# Patient Record
Sex: FEMALE | Race: WHITE | Employment: FULL TIME | URBAN - NONMETROPOLITAN AREA
[De-identification: names, ages, dates, MRNs, and addresses within clinical notes are randomized per-mention and may not be internally consistent; named-entity substitution may affect disease eponyms.]

---

## 2017-02-20 ENCOUNTER — OFFICE VISIT (OUTPATIENT)
Dept: FAMILY MEDICINE | Facility: OTHER | Age: 47
End: 2017-02-20
Attending: FAMILY MEDICINE
Payer: COMMERCIAL

## 2017-02-20 VITALS
HEIGHT: 65 IN | WEIGHT: 204 LBS | DIASTOLIC BLOOD PRESSURE: 78 MMHG | SYSTOLIC BLOOD PRESSURE: 128 MMHG | BODY MASS INDEX: 33.99 KG/M2 | HEART RATE: 91 BPM | RESPIRATION RATE: 17 BRPM

## 2017-02-20 DIAGNOSIS — E78.5 HYPERLIPIDEMIA WITH TARGET LDL LESS THAN 130: ICD-10-CM

## 2017-02-20 DIAGNOSIS — Z12.31 ENCOUNTER FOR SCREENING MAMMOGRAM FOR BREAST CANCER: ICD-10-CM

## 2017-02-20 DIAGNOSIS — Z83.1: ICD-10-CM

## 2017-02-20 DIAGNOSIS — F90.0 ATTENTION DEFICIT HYPERACTIVITY DISORDER (ADHD), PREDOMINANTLY INATTENTIVE TYPE: ICD-10-CM

## 2017-02-20 DIAGNOSIS — Z00.00 ROUTINE GENERAL MEDICAL EXAMINATION AT A HEALTH CARE FACILITY: Primary | ICD-10-CM

## 2017-02-20 PROCEDURE — 99396 PREV VISIT EST AGE 40-64: CPT | Performed by: FAMILY MEDICINE

## 2017-02-20 RX ORDER — MULTIVITAMIN,THERAPEUTIC
1 TABLET ORAL DAILY
COMMUNITY

## 2017-02-20 ASSESSMENT — PAIN SCALES - GENERAL: PAINLEVEL: NO PAIN (0)

## 2017-02-20 NOTE — PATIENT INSTRUCTIONS
1.  Increase fish oil to 2000 to 3000mg daily  2.  Wellbutrin works on norepinephrine and seratonin vs straterra  3.  sprinolactone for acne and hair growth

## 2017-02-20 NOTE — NURSING NOTE
"Chief Complaint   Patient presents with     Physical       Initial /78  Pulse 91  Resp 17  Ht 5' 4.5\" (1.638 m)  Wt 204 lb (92.5 kg)  BMI 34.48 kg/m2 Estimated body mass index is 34.48 kg/(m^2) as calculated from the following:    Height as of this encounter: 5' 4.5\" (1.638 m).    Weight as of this encounter: 204 lb (92.5 kg).  Medication Reconciliation: complete  "

## 2017-02-20 NOTE — MR AVS SNAPSHOT
After Visit Summary   2/20/2017    MS Ada Ramirez    MRN: [id number]           Patient Information     Date Of Birth          1970        Visit Information        Provider Department      2/20/2017 1:30 PM [facility]        Today's Diagnoses     Routine general medical examination at a health care facility    -  1    Family history of amebiasis        Hyperlipidemia with target LDL less than 130        Attention deficit hyperactivity disorder (ADHD), predominantly inattentive type        Encounter for screening mammogram for breast cancer          Care Instructions    1.  Increase fish oil to 2000 to 3000mg daily  2.  Wellbutrin works on norepinephrine and seratonin vs straterra  3.  sprinolactone for acne and hair growth        Follow-ups after your visit        Future tests that were ordered for you today     Open Future Orders        Priority Expected Expires Ordered    Comprehensive metabolic panel Routine 2/21/2017 2/2/2018 2/20/2017    Lipid Profile (Chol, Trig, HDL, LDL calc) Routine 2/21/2017 2/2/2018 2/20/2017    Fecal Lactoferrin Routine  2/20/2018 2/20/2017    Stool culture SSCE Routine  2/20/2018 2/20/2017    Ova and Parasite Screen Routine  2/20/2018 2/20/2017            Who to contact     If you have questions or need follow up information about today's clinic visit or your schedule please contact [facility] directly at [phone].  Normal or non-critical lab and imaging results will be communicated to you by MyChart, letter or phone within 4 business days after the clinic has received the results. If you do not hear from us within 7 days, please contact the clinic through MyChart or phone. If you have a critical or abnormal lab result, we will notify you by phone as soon as possible.  Submit refill requests through [employer] or call your pharmacy and they will forward the refill request to us. Please allow 3 business days for  "your refill to be completed.          Additional Information About Your Visit        [employer] Information     [employer] gives you secure access to your electronic health record. If you see a primary care provider, you can also send messages to your care team and make appointments. If you have questions, please call your primary care clinic.  If you do not have a primary care provider, please call [phone] and they will assist you.        Care EveryWhere ID     This is your Care EveryWhere ID. This could be used by other organizations to access your [facility] medical records  [id number]        Your Vitals Were     Pulse Respirations Height BMI (Body Mass Index)          91 17 5' 4.5\" (1.638 m) 34.48 kg/m2         Blood Pressure from Last 3 Encounters:   02/20/17 128/78   09/16/16 118/70   08/26/16 122/78    Weight from Last 3 Encounters:   02/20/17 204 lb (92.5 kg)   09/16/16 196 lb (88.9 kg)   08/26/16 208 lb (94.3 kg)              We Performed the Following     MA SCREENING DIGITAL BILATERAL ([place])        Primary Care Provider Office Phone # Fax #    Gloria Arguelles [phone]       [facility] [place] [address]  [place] [address]        Thank you!     Thank you for choosing [facility] [place]  for your care. Our goal is always to provide you with excellent care. Hearing back from our patients is one way we can continue to improve our services. Please take a few minutes to complete the written survey that you may receive in the mail after your visit with us. Thank you!             Your Updated Medication List - Protect others around you: Learn how to safely use, store and throw away your medicines at www.disposemymeds.org.          This list is accurate as of: 2/20/17  2:08 PM.  Always use your most recent med list.                   Brand Name Dispense Instructions for use    Fish Oil Oil      1 capsule daily       GREEN TEA PO      Take 1 tablet by mouth daily       " HERBALS      DEM       multivitamin, therapeutic Tabs tablet      Take 1 tablet by mouth daily

## 2017-02-21 NOTE — PROGRESS NOTES
Female History and Physical     Ada Ramirez MRN# [id number]   YOB: 1970 Age: 46 year old     Primary care provider: Gloria Arguelles                 Chief Complaint:   Wants to talk about alternative ADHD treatments    History is obtained from the patient         History of Present Illness:   This patient is a 46 year old female who presents for CPE and above             Past Medical History:     Past Medical History   Diagnosis Date     Acne      ADHD (attention deficit hyperactivity disorder)      Anxiety      Constipation      Obesity      Rosacea      Tremor of hands and face      essential tremor             Past Surgical History:     Past Surgical History   Procedure Laterality Date     Hysterectomy  2012     ovaries, cervix remain, fibroids, menorrhagia              Gynecologic History:   No LMP recorded. Patient has had a hysterectomy.  Contraception:   Status post hysterectomy  Sexually transmitted disease history:  none  PAP smear history:  Last PAP was normal.       Last mammogram:  Mammogram was normal.          Obstetrical History:   See Epic         Social History:     Social History   Substance Use Topics     Smoking status: Never Smoker     Smokeless tobacco: Never Used     Alcohol use Yes      Comment: 1/week             Family History:     Family History   Problem Relation Age of Onset     Family History Negative Mother      Psychotic Disorder Father      ADHD     Breast Cancer Maternal Grandmother 58     smoker     Neurologic Disorder Sister      MS, not on meds, may not have dx     Family History Negative Brother      Psychotic Disorder Paternal Grandmother      demenita     CEREBROVASCULAR DISEASE Paternal Grandfather      Unknown/Adopted Maternal Grandfather              Immunizations:     Immunization History   Administered Date(s) Administered     Influenza (IIV3) 12/27/2010, 10/13/2011     Influenza Vaccine IM 3yrs+ 4 Valent IIV4 11/24/2014     Tdap (Adacel,Boostrix)  "08/31/2012            Allergies:     Allergies   Allergen Reactions     Sulfa Drugs Rash             Medications:     Current Outpatient Prescriptions:      Green Tea, Camillia sinensis, (GREEN TEA PO), Take 1 tablet by mouth daily, Disp: , Rfl:      Fish Oil OIL, 1 capsule daily, Disp: , Rfl:      HERBALS, DEM, Disp: , Rfl:      multivitamin, therapeutic (THERA-VIT) TABS tablet, Take 1 tablet by mouth daily, Disp: , Rfl:             Review of Systems:   A comprehensive, 10 point review of systems was performed and found to be negative except: as above              Physical Exam:   Vitals were reviewed  Blood pressure 128/78, pulse 91, resp. rate 17, height 5' 4.5\" (1.638 m), weight 204 lb (92.5 kg).  Constitutional:   awake, alert, cooperative, no apparent distress, and appears stated age   Eyes:   Lids and lashes normal, pupils equal, round and reactive to light, extra ocular muscles intact, sclera clear, conjunctiva normal   ENT:   Normocephalic, without obvious abnormality, atramatic, external ears without lesions, oral pharynx with moist mucus membranes, tonsils without erythema or exudates, gums normal and good dentition.   Neck:   Supple, symmetrical, trachea midline, no adenopathy, thyroid symmetric, not enlarged and no tenderness, skin normal   Hematologic / Lymphatic:   no cervical lymphadenopathy   Lungs:   No increased work of breathing, good air exchange, clear to auscultation bilaterally, no crackles or wheezing   Cardiovascular:   Normal apical impulse, regular rate and rhythm, normal S1 and S2, no S3 or S4, and no murmur noted   Abdomen:   No scars, normal bowel sounds, soft, non-distended, non-tender, no masses palpated, no hepatosplenomegally   Chest / Breast:   Breasts symmetrical, skin without lesion(s), no nipple retraction or dimpling, no nipple discharge, no masses palpated, no axillary or supraclavicular adenopathy   Genitounirinary:   External Genitalia:  General appearance; normal  Urethra: "  Fullness absent  Bladder:  Fullness absent, Masses absent  Vagina:  General appearance normal  Cervix:  Palpated wnl  Uterus:  Hystory of hysterectomy  Adenexa:  Masses absent, Tenderness absent   Musculoskeletal:   There is no redness, warmth, or swelling of the joints.  Full range of motion noted.  Motor strength is 5 out of 5 all extremities bilaterally.  Tone is normal.   Neurologic:   Awake, alert, oriented to name, place and time.  Cranial nerves II-XII are grossly intact.  Motor is 5 out of 5 bilaterally.  Sensory is intact.  and gait is normal.   Neuropsychiatric:   General: normal, calm and normal eye contact  Level of consciousness: alert / normal  Affect: anxious  Orientation: oriented to self, place, time and situation  Memory and insight: normal, memory for past and recent events intact and thought process normal   Skin:   no bruising or bleeding, Body Locations:  No rashes          Data:   No visits with results within 1 Day(s) from this visit.  Latest known visit with results is:    Office Visit on 02/18/2015   Component Date Value Ref Range Status     Cholesterol 02/18/2015 209* <200 mg/dL Final    Comment: LDL Cholesterol is the primary guide to therapy.   The NCEP recommends further evaluation of: patients with cholesterol greater   than 200 mg/dL if additional risk factors are present, cholesterol greater   than   240 mg/dL, triglycerides greater than 150 mg/dL, or HDL less than 40 mg/dL.       Triglycerides 02/18/2015 259* 0 - 150 mg/dL Final     HDL Cholesterol 02/18/2015 47* >50 mg/dL Final     LDL Cholesterol Calculated 02/18/2015 110  0 - 129 mg/dL Final    Comment: LDL Cholesterol is the primary guide to therapy: LDL-cholesterol goal in high   risk patients is <100 mg/dL and in very high risk patients is <70 mg/dL.       VLDL-Cholesterol 02/18/2015 52* 0 - 30 mg/dL Final     Cholesterol/HDL Ratio 02/18/2015 4.4  0.0 - 5.0 Final     Sodium 02/18/2015 139  133 - 144 mmol/L Final     Potassium  02/18/2015 3.6  3.4 - 5.3 mmol/L Final     Chloride 02/18/2015 106  94 - 109 mmol/L Final     Carbon Dioxide 02/18/2015 29  20 - 32 mmol/L Final     Anion Gap 02/18/2015 4  3 - 14 mmol/L Final     Glucose 02/18/2015 84  70 - 99 mg/dL Final    Comment: Effective 7/30/2014, the reference range for this assay has changed to reflect   new instrumentation/methodology.       Urea Nitrogen 02/18/2015 13  7 - 30 mg/dL Final    Comment: Effective 7/30/2014, the reference range for this assay has changed to reflect   new instrumentation/methodology.       Creatinine 02/18/2015 0.74  0.52 - 1.04 mg/dL Final     GFR Estimate 02/18/2015 85  >60 mL/min/1.7m2 Final    Non  GFR Calc     GFR Estimate If Black 02/18/2015   >60 mL/min/1.7m2 Final                    Value:>90   GFR Calc       Calcium 02/18/2015 8.6  8.5 - 10.1 mg/dL Final    Comment: Effective 7/30/2014, the reference range for this assay has changed to reflect   new instrumentation/methodology.       Bilirubin Total 02/18/2015 0.3  0.2 - 1.3 mg/dL Final     Albumin 02/18/2015 3.9  3.4 - 5.0 g/dL Final     Protein Total 02/18/2015 8.0  6.8 - 8.8 g/dL Final     Alkaline Phosphatase 02/18/2015 122  40 - 150 U/L Final     ALT 02/18/2015 134* 0 - 50 U/L Final     AST 02/18/2015 58* 0 - 45 U/L Final     TSH 02/18/2015 1.18  0.40 - 4.00 mU/L Final    Comment: Effective 7/30/2014, the reference range for this assay has changed to reflect   new instrumentation/methodology.       Lutropin 02/18/2015 2.2  IU/L Final    Comment: LH Reference Range   Female: Follicular      1.9-12.5           Mid-cycle       8.7-76.3           Luteal          0.5-16.9           Postmenopausal  15.9-54.0       FSH 02/18/2015 3.2  IU/L Final    Comment: FSH Reference Range   Female: Follicular      2.5-10.2           Mid-cycle       3.4-33.4           Luteal          1.5-9.1           Postmenopausal  23.0-116.3       Percent Testosterone Free 02/18/2015 1.7  1.0 - 3.8  % Final     Testosterone Total 02/18/2015 18  8 - 60 ng/dL Final    Comment: This test was developed and its performance characteristics determined by the   [facility],  [facility]. It has   not been cleared or approved by the FDA. The laboratory is regulated under   CLIA   as qualified to perform high-complexity testing. This test is used for   clinical   purposes. It should not be regarded as investigational or for research.       Testosterone Free 02/18/2015 0.3  0.1 - 1.5 ng/dL Final    Comment: Analyte Specific Reagents (ASRs) are used in many laboratory tests necessary   for   standard medical care and generally do not require FDA approval.  This test   was   developed and its preformance characteristics determined by   [facility].  It has not been cleared or approved by   the U.S. Food and Drug Administration.       DHEA Sulfate 02/18/2015 61  35 - 430 ug/dL Final     Androstenedione 02/18/2015 0.515   Final    Comment: Reference range: 0.130 to 0.820  Unit: ng/mL  (Note)  INTERPRETIVE INFORMATION: Androstenedione, Females 18 years  and older  Pre-menopausal: 0.26-2.14 ng/mL  Post-menopausal: 0.13-0.82 ng/mL  REFERENCE INTERVAL: Androstenedione by [facility]  Access complete set of age- and/or gender-specific  reference intervals for this test in the [employer]  Test Directory ([employer]).  Test developed and characteristics determined by [employer]. See Compliance Statement B: [employer]/CS  Performed by [employer],  [address] [place] [id number] [phone]  [email or web address], Juanpablo Wade MD, Lab. Director       Prolactin 02/18/2015 8  3 - 27 ug/L Final    Reference ranges apply to non-pregnant females only.   ]         Assessment and Plan:   (Z00.00) Routine general medical examination at a health care facility  (primary encounter diagnosis)  Comment:   Plan: f/u 1 year    (Z83.1) Family  history of amebiasis  Comment: boyfriend with known diagnosis   Plan: Stool culture SSCE, Ova and Parasite Screen,         Fecal Lactoferrin          (E78.5) Hyperlipidemia with target LDL less than 130  Comment:   Plan: Lipid Profile (Chol, Trig, HDL, LDL calc),         Comprehensive metabolic panel        Will come fasting    (F90.0) Attention deficit hyperactivity disorder (ADHD), predominantly inattentive type  Comment:   Plan: using green tea, fish oil and DIM    (Z12.31) Encounter for screening mammogram for breast cancer  Comment:   Plan: MA SCREENING DIGITAL BILATERAL ([place])             Patient was agreeable to this plan and had no further questions.    Gloria Arguelles MD  2/20/2017  6:32 PM

## 2017-02-27 DIAGNOSIS — Z83.1: ICD-10-CM

## 2017-02-27 LAB — LACTOFERRIN STL QL IA: NORMAL

## 2017-02-27 PROCEDURE — 87046 STOOL CULTR AEROBIC BACT EA: CPT | Mod: 59 | Performed by: FAMILY MEDICINE

## 2017-02-27 PROCEDURE — 83630 LACTOFERRIN FECAL (QUAL): CPT | Performed by: FAMILY MEDICINE

## 2017-02-27 PROCEDURE — 87046 STOOL CULTR AEROBIC BACT EA: CPT | Performed by: FAMILY MEDICINE

## 2017-02-27 PROCEDURE — 87329 GIARDIA AG IA: CPT | Performed by: FAMILY MEDICINE

## 2017-02-27 PROCEDURE — 87015 SPECIMEN INFECT AGNT CONCNTJ: CPT | Performed by: FAMILY MEDICINE

## 2017-02-27 PROCEDURE — 87328 CRYPTOSPORIDIUM AG IA: CPT | Performed by: FAMILY MEDICINE

## 2017-02-27 PROCEDURE — 87899 AGENT NOS ASSAY W/OPTIC: CPT | Performed by: FAMILY MEDICINE

## 2017-02-27 PROCEDURE — 87045 FECES CULTURE AEROBIC BACT: CPT | Performed by: FAMILY MEDICINE

## 2017-02-28 LAB
G LAMBLIA+CRYPTOSP AG STL QL IA: NORMAL
MICRO REPORT STATUS: NORMAL
SPECIMEN SOURCE: NORMAL

## 2017-03-02 LAB
BACTERIA SPEC CULT: NORMAL
E COLI SXT1+2 STL IA: NORMAL
MICRO REPORT STATUS: NORMAL
SPECIMEN SOURCE: NORMAL

## 2017-05-10 DIAGNOSIS — Z12.31 VISIT FOR SCREENING MAMMOGRAM: Primary | ICD-10-CM

## 2017-05-10 PROCEDURE — G0202 SCR MAMMO BI INCL CAD: HCPCS | Mod: TC | Performed by: RADIOLOGY

## 2017-05-10 PROCEDURE — 77063 BREAST TOMOSYNTHESIS BI: CPT | Mod: TC | Performed by: RADIOLOGY

## 2018-02-01 ENCOUNTER — TELEPHONE (OUTPATIENT)
Dept: FAMILY MEDICINE | Facility: OTHER | Age: 48
End: 2018-02-01

## 2018-02-01 NOTE — TELEPHONE ENCOUNTER
Left message explaining first available appt is feb 22nd but she is more than welcome to see if another provider has an opening sooner. Also explained she could go to . Gave number for scheduling.

## 2018-02-01 NOTE — TELEPHONE ENCOUNTER
2:54 PM    Reason for Call: OVERBOOK    Patient is having the following symptoms: patient has heartburn, mostly in the afternoon/evening.  I told her we are trained to tell anyone who calls in with that symptom to report to the ER.  She does not want to.  Instead, she would like nurse to return her call with the next soonest appointment time with Dr. Arguelles    The patient is requesting an appointment for (see above).    Was an appointment offered for this call? No  If yes : Appointment type              Date    Preferred method for responding to this message: Telephone Call     What is your phone number [phone]    If we cannot reach you directly, may we leave a detailed response at the number you provided? Yes    Can this message wait until your PCP/provider returns, if unavailable today? Not applicable, provider is in    Abril Raygoza

## 2018-03-21 ENCOUNTER — OFFICE VISIT (OUTPATIENT)
Dept: FAMILY MEDICINE | Facility: OTHER | Age: 48
End: 2018-03-21
Attending: FAMILY MEDICINE
Payer: COMMERCIAL

## 2018-03-21 VITALS
OXYGEN SATURATION: 99 % | BODY MASS INDEX: 35.66 KG/M2 | TEMPERATURE: 98.4 F | WEIGHT: 211 LBS | HEART RATE: 77 BPM | DIASTOLIC BLOOD PRESSURE: 72 MMHG | SYSTOLIC BLOOD PRESSURE: 126 MMHG

## 2018-03-21 DIAGNOSIS — Z00.00 ROUTINE GENERAL MEDICAL EXAMINATION AT A HEALTH CARE FACILITY: Primary | ICD-10-CM

## 2018-03-21 DIAGNOSIS — R73.09 ELEVATED GLUCOSE: ICD-10-CM

## 2018-03-21 DIAGNOSIS — F90.0 ATTENTION DEFICIT HYPERACTIVITY DISORDER (ADHD), PREDOMINANTLY INATTENTIVE TYPE: ICD-10-CM

## 2018-03-21 DIAGNOSIS — E66.9 OBESITY WITH SERIOUS COMORBIDITY, UNSPECIFIED CLASSIFICATION, UNSPECIFIED OBESITY TYPE: ICD-10-CM

## 2018-03-21 DIAGNOSIS — E78.5 HYPERLIPIDEMIA WITH TARGET LDL LESS THAN 130: ICD-10-CM

## 2018-03-21 DIAGNOSIS — Z12.39 SCREENING BREAST EXAMINATION: ICD-10-CM

## 2018-03-21 DIAGNOSIS — G47.39 OTHER SLEEP APNEA: ICD-10-CM

## 2018-03-21 DIAGNOSIS — K21.9 GASTROESOPHAGEAL REFLUX DISEASE, ESOPHAGITIS PRESENCE NOT SPECIFIED: ICD-10-CM

## 2018-03-21 LAB
ALBUMIN SERPL-MCNC: 3.9 G/DL (ref 3.4–5)
ALP SERPL-CCNC: 97 U/L (ref 40–150)
ALT SERPL W P-5'-P-CCNC: 43 U/L (ref 0–50)
ANION GAP SERPL CALCULATED.3IONS-SCNC: 6 MMOL/L (ref 3–14)
AST SERPL W P-5'-P-CCNC: 24 U/L (ref 0–45)
BILIRUB SERPL-MCNC: 0.4 MG/DL (ref 0.2–1.3)
BUN SERPL-MCNC: 13 MG/DL (ref 7–30)
CALCIUM SERPL-MCNC: 9 MG/DL (ref 8.5–10.1)
CHLORIDE SERPL-SCNC: 102 MMOL/L (ref 94–109)
CHOLEST SERPL-MCNC: 241 MG/DL
CO2 SERPL-SCNC: 28 MMOL/L (ref 20–32)
CREAT SERPL-MCNC: 0.76 MG/DL (ref 0.52–1.04)
EST. AVERAGE GLUCOSE BLD GHB EST-MCNC: 114 MG/DL
GFR SERPL CREATININE-BSD FRML MDRD: 80 ML/MIN/1.7M2
GLUCOSE SERPL-MCNC: 89 MG/DL (ref 70–99)
HBA1C MFR BLD: 5.6 % (ref 4.3–6)
HDLC SERPL-MCNC: 37 MG/DL
LDLC SERPL CALC-MCNC: 166 MG/DL
NONHDLC SERPL-MCNC: 204 MG/DL
POTASSIUM SERPL-SCNC: 3.8 MMOL/L (ref 3.4–5.3)
PROT SERPL-MCNC: 8.2 G/DL (ref 6.8–8.8)
SODIUM SERPL-SCNC: 136 MMOL/L (ref 133–144)
TRIGL SERPL-MCNC: 192 MG/DL

## 2018-03-21 PROCEDURE — 36415 COLL VENOUS BLD VENIPUNCTURE: CPT | Performed by: FAMILY MEDICINE

## 2018-03-21 PROCEDURE — 83036 HEMOGLOBIN GLYCOSYLATED A1C: CPT | Performed by: FAMILY MEDICINE

## 2018-03-21 PROCEDURE — 80053 COMPREHEN METABOLIC PANEL: CPT | Performed by: FAMILY MEDICINE

## 2018-03-21 PROCEDURE — G0123 SCREEN CERV/VAG THIN LAYER: HCPCS | Performed by: FAMILY MEDICINE

## 2018-03-21 PROCEDURE — 99000 SPECIMEN HANDLING OFFICE-LAB: CPT | Performed by: FAMILY MEDICINE

## 2018-03-21 PROCEDURE — 99396 PREV VISIT EST AGE 40-64: CPT | Performed by: FAMILY MEDICINE

## 2018-03-21 PROCEDURE — 87624 HPV HI-RISK TYP POOLED RSLT: CPT | Mod: 90 | Performed by: FAMILY MEDICINE

## 2018-03-21 PROCEDURE — 80061 LIPID PANEL: CPT | Performed by: FAMILY MEDICINE

## 2018-03-21 ASSESSMENT — ANXIETY QUESTIONNAIRES
6. BECOMING EASILY ANNOYED OR IRRITABLE: NOT AT ALL
3. WORRYING TOO MUCH ABOUT DIFFERENT THINGS: NOT AT ALL
4. TROUBLE RELAXING: NOT AT ALL
2. NOT BEING ABLE TO STOP OR CONTROL WORRYING: NOT AT ALL
7. FEELING AFRAID AS IF SOMETHING AWFUL MIGHT HAPPEN: NOT AT ALL
5. BEING SO RESTLESS THAT IT IS HARD TO SIT STILL: NOT AT ALL
GAD7 TOTAL SCORE: 0
1. FEELING NERVOUS, ANXIOUS, OR ON EDGE: NOT AT ALL

## 2018-03-21 ASSESSMENT — PAIN SCALES - GENERAL: PAINLEVEL: NO PAIN (0)

## 2018-03-21 NOTE — LETTER
March 26, 2018      Ada Ramirez  [address]  [facility] [address]        Dear ,    We are writing to inform you of your test results.        Labs show no diabetes yet.  Your cholesterol is higher. Your should try to  work on 5-10 lb weight loss, cut back on sugars, and increase your amount of exercise.       Resulted Orders   Lipid Profile (Chol, Trig, HDL, LDL calc)   Result Value Ref Range    Cholesterol 241 (H) <200 mg/dL      Comment:      Desirable:       <200 mg/dl    Triglycerides 192 (H) <150 mg/dL      Comment:      Borderline high:  150-199 mg/dl  High:             200-499 mg/dl  Very high:       >499 mg/dl      HDL Cholesterol 37 (L) >49 mg/dL    LDL Cholesterol Calculated 166 (H) <100 mg/dL      Comment:      Above desirable:  100-129 mg/dl  Borderline High:  130-159 mg/dL  High:             160-189 mg/dL  Very high:       >189 mg/dl      Non HDL Cholesterol 204 (H) <130 mg/dL      Comment:      Above Desirable:  130-159 mg/dl  Borderline high:  160-189 mg/dl  High:             190-219 mg/dl  Very high:       >219 mg/dl     Comprehensive metabolic panel   Result Value Ref Range    Sodium 136 133 - 144 mmol/L    Potassium 3.8 3.4 - 5.3 mmol/L    Chloride 102 94 - 109 mmol/L    Carbon Dioxide 28 20 - 32 mmol/L    Anion Gap 6 3 - 14 mmol/L    Glucose 89 70 - 99 mg/dL    Urea Nitrogen 13 7 - 30 mg/dL    Creatinine 0.76 0.52 - 1.04 mg/dL    GFR Estimate 80 >60 mL/min/1.7m2      Comment:      Non  GFR Calc    GFR Estimate If Black >90 >60 mL/min/1.7m2      Comment:       GFR Calc    Calcium 9.0 8.5 - 10.1 mg/dL    Bilirubin Total 0.4 0.2 - 1.3 mg/dL    Albumin 3.9 3.4 - 5.0 g/dL    Protein Total 8.2 6.8 - 8.8 g/dL    Alkaline Phosphatase 97 40 - 150 U/L    ALT 43 0 - 50 U/L    AST 24 0 - 45 U/L   Hemoglobin A1c   Result Value Ref Range    Hemoglobin A1C 5.6 4.3 - 6.0 %   Estimated Average Glucose   Result Value Ref Range    Estimated Average Glucose 114 mg/dL        If you have any questions or concerns, please call the clinic at the number listed above.       Sincerely,        Gloria Arguelles MD
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
## 2018-03-21 NOTE — LETTER
March 28, 2018      Ada Ramirez  [address]  [place] [address]        Dear ,    We are writing to inform you of your test results.    Your pap results were within normal limits.  Any questions or concerns please feel free to contact the office.  Thank you.    Resulted Orders   Lipid Profile (Chol, Trig, HDL, LDL calc)   Result Value Ref Range    Cholesterol 241 (H) <200 mg/dL      Comment:      Desirable:       <200 mg/dl    Triglycerides 192 (H) <150 mg/dL      Comment:      Borderline high:  150-199 mg/dl  High:             200-499 mg/dl  Very high:       >499 mg/dl      HDL Cholesterol 37 (L) >49 mg/dL    LDL Cholesterol Calculated 166 (H) <100 mg/dL      Comment:      Above desirable:  100-129 mg/dl  Borderline High:  130-159 mg/dL  High:             160-189 mg/dL  Very high:       >189 mg/dl      Non HDL Cholesterol 204 (H) <130 mg/dL      Comment:      Above Desirable:  130-159 mg/dl  Borderline high:  160-189 mg/dl  High:             190-219 mg/dl  Very high:       >219 mg/dl     Comprehensive metabolic panel   Result Value Ref Range    Sodium 136 133 - 144 mmol/L    Potassium 3.8 3.4 - 5.3 mmol/L    Chloride 102 94 - 109 mmol/L    Carbon Dioxide 28 20 - 32 mmol/L    Anion Gap 6 3 - 14 mmol/L    Glucose 89 70 - 99 mg/dL    Urea Nitrogen 13 7 - 30 mg/dL    Creatinine 0.76 0.52 - 1.04 mg/dL    GFR Estimate 80 >60 mL/min/1.7m2      Comment:      Non  GFR Calc    GFR Estimate If Black >90 >60 mL/min/1.7m2      Comment:       GFR Calc    Calcium 9.0 8.5 - 10.1 mg/dL    Bilirubin Total 0.4 0.2 - 1.3 mg/dL    Albumin 3.9 3.4 - 5.0 g/dL    Protein Total 8.2 6.8 - 8.8 g/dL    Alkaline Phosphatase 97 40 - 150 U/L    ALT 43 0 - 50 U/L    AST 24 0 - 45 U/L   Hemoglobin A1c   Result Value Ref Range    Hemoglobin A1C 5.6 4.3 - 6.0 %   A pap thin layer screen with  HPV - recommended age 30 - 65 years (select HPV order below)   Result Value Ref Range    PAP NIL     Copath  Report         Patient Name: ELIZABETH MARIE  MR#: [id number]  Specimen #: [id number]  Collected: 3/21/2018  Received: 3/22/2018  Reported: 3/27/2018 09:21  Ordering Phy(s): GLORIA MCCRARY    For improved result formatting, select 'View Enhanced Report Format' under   Linked Documents section.    SPECIMEN/STAIN PROCESS:  Pap thin layer prep screening ([facility])       Pap-Cyto x 1, HPV ordered x 1    SOURCE: Vaginal  ----------------------------------------------------------------   Pap thin layer prep screening ([facility])  SPECIMEN ADEQUACY:  Satisfactory for evaluation.    CYTOLOGIC INTERPRETATION:    Negative for intraepithelial lesion or malignancy    Electronically signed out by:  BUCKY Brasher ( ASCP)    Processed and screened at [facility]    CLINICAL HISTORY:    Partial Hysterectomy, Previous normal pap: 1/15,    Papanicolaou Test Limitations:  Cervical cytology is a screening test with   limited sensitivity; regular  screening  is critical for cancer prevention; Pap tests are primarily   effective for the diagnosis/prevention of  squamous cell carcinoma, not adenocarcinomas or other cancers.    TESTING LAB LOCATION:  [address] [address]  [phone]    COLLECTION SITE:  Client:  [facility]  Location: [facility])     Estimated Average Glucose   Result Value Ref Range    Estimated Average Glucose 114 mg/dL       If you have any questions or concerns, please call the clinic at the number listed above.       Sincerely,        Gloria Mccrary MD

## 2018-03-21 NOTE — NURSING NOTE
"Chief Complaint   Patient presents with     Physical       Initial /72  Pulse 77  Temp 98.4  F (36.9  C) (Tympanic)  Wt 211 lb (95.7 kg)  SpO2 99%  BMI 35.66 kg/m2 Estimated body mass index is 35.66 kg/(m^2) as calculated from the following:    Height as of 2/20/17: 5' 4.5\" (1.638 m).    Weight as of this encounter: 211 lb (95.7 kg).  Medication Reconciliation: complete     Evie Santiago    "

## 2018-03-21 NOTE — MR AVS SNAPSHOT
After Visit Summary   3/21/2018    MS Ada Ramirez    MRN: [id number]           Patient Information     Date Of Birth          1970        Visit Information        Provider Department      3/21/2018 1:30 PM Gloria Arguelles MD [facility]        Today's Diagnoses     Routine general medical examination at a health care facility    -  1    Hyperlipidemia with target LDL less than 130        Attention deficit hyperactivity disorder (ADHD), predominantly inattentive type        Elevated glucose        Gastroesophageal reflux disease, esophagitis presence not specified        Other sleep apnea        Obesity with serious comorbidity, unspecified classification, unspecified obesity type        Screening breast examination          Care Instructions      Preventive Health Recommendations  Female Ages 40 to 49    Yearly exam:     See your health care provider every year in order to  1. Review health changes.   2. Discuss preventive care.    3. Review your medicines if your doctor prescribed any.      Get a Pap test every three years (unless you have an abnormal result and your provider advises testing more often).      If you get Pap tests with HPV test, you only need to test every 5 years, unless you have an abnormal result. You do not need a Pap test if your uterus was removed (hysterectomy) and you have not had cancer.      You should be tested each year for STDs (sexually transmitted diseases), if you're at risk.       Ask your doctor if you should have a mammogram.      Have a colonoscopy (test for colon cancer) if someone in your family has had colon cancer or polyps before age 50.       Have a cholesterol test every 5 years.       Have a diabetes test (fasting glucose) after age 45. If you are at risk for diabetes, you should have this test every 3 years.    Shots: Get a flu shot each year. Get a tetanus shot every 10 years.     Nutrition:     Eat at least 5 servings of fruits  and vegetables each day.    Eat whole-grain bread, whole-wheat pasta and brown rice instead of white grains and rice.    Talk to your provider about Calcium and Vitamin D.     Lifestyle    Exercise at least 150 minutes a week (an average of 30 minutes a day, 5 days a week). This will help you control your weight and prevent disease.    Limit alcohol to one drink per day.    No smoking.     Wear sunscreen to prevent skin cancer.    See your dentist every six months for an exam and cleaning.          Follow-ups after your visit        Additional Services     SLEEP EVALUATION & MANAGEMENT REFERRAL - ADULT -[facility] [place] [phone] (Age 5 and up)       Please be aware that coverage of these services is subject to the terms and limitations of your health insurance plan.  Call member services at your health plan with any benefit or coverage questions.      Please bring the following to your appointment:    >>   List of current medications   >>   This referral request   >>   Any documents/labs given to you for this referral                      Your next 10 appointments already scheduled     May 16, 2018  8:00 AM CDT   (Arrive by 7:45 AM)   MA SCREENING DIGITAL BILATERAL with HCMA1   [facility] ([facility] [place] )    [address] [facility] [address]   [phone]           Do not use any powder, lotion or deodorant under your arms or on your breast. If you do, we will ask you to remove it before your exam.  Wear comfortable, two-piece clothing.  If you have any allergies, tell your care team.  Bring any previous mammograms from other facilities or have them mailed to the breast center. Three-dimensional (3D) mammograms are available at [place] locations in [facility], [facility], [place], [place], and [place]. [facility] locations include [facility] in [place]. Benefits of 3D mammograms  "include: - Improved rate of cancer detection - Decreases your chance of having to go back for more tests, which means fewer: - \"False-positive\" results (This means that there is an abnormal area but it isn't cancer.) - Invasive testing procedures, such as a biopsy or surgery - Can provide clearer images of the breast if you have dense breast tissue. 3D mammography is an optional exam that anyone can have with a 2D mammogram. It doesn't replace or take the place of a 2D mammogram. 2D mammograms remain an effective screening test for all women.  Not all insurance companies cover the cost of a 3D mammogram. Check with your insurance.              Future tests that were ordered for you today     Open Future Orders        Priority Expected Expires Ordered    MA SCREENING DIGITAL BILATERAL ([place]) Routine 5/18/2018 3/21/2019 3/21/2018    SLEEP EVALUATION & MANAGEMENT REFERRAL - ADULT -[facility] [place] [phone] (Age 5 and up) Routine  3/21/2019 3/21/2018            Who to contact     If you have questions or need follow up information about today's clinic visit or your schedule please contact [facility] directly at [phone].  Normal or non-critical lab and imaging results will be communicated to you by MyChart, letter or phone within 4 business days after the clinic has received the results. If you do not hear from us within 7 days, please contact the clinic through [employer] or phone. If you have a critical or abnormal lab result, we will notify you by phone as soon as possible.  Submit refill requests through [email or web address] or call your pharmacy and they will forward the refill request to us. Please allow 3 business days for your refill to be completed.          Additional Information About Your Visit        [employer] Information     [email or web address] gives you secure access to your electronic health record. If you see a primary care provider, you can also send messages to your care team and make " appointments. If you have questions, please call your primary care clinic.  If you do not have a primary care provider, please call [phone] and they will assist you.        Care EveryWhere ID     This is your Care EveryWhere ID. This could be used by other organizations to access your [place] medical records  [id number]        Your Vitals Were     Pulse Temperature Pulse Oximetry BMI (Body Mass Index)          77 98.4  F (36.9  C) (Tympanic) 99% 35.66 kg/m2         Blood Pressure from Last 3 Encounters:   03/21/18 126/72   02/20/17 128/78   09/16/16 118/70    Weight from Last 3 Encounters:   03/21/18 211 lb (95.7 kg)   02/20/17 204 lb (92.5 kg)   09/16/16 196 lb (88.9 kg)              We Performed the Following     A pap thin layer screen with  HPV - recommended age 30 - 65 years (select HPV order below)     Comprehensive metabolic panel     Hemoglobin A1c     HPV High Risk Types DNA Cervical     Lipid Profile (Chol, Trig, HDL, LDL calc)          Today's Medication Changes          These changes are accurate as of 3/21/18  2:17 PM.  If you have any questions, ask your nurse or doctor.               Start taking these medicines.        Dose/Directions    ranitidine 300 MG tablet   Commonly known as:  ZANTAC   Used for:  Gastroesophageal reflux disease, esophagitis presence not specified   Started by:  Gloria Arguelles MD        Dose:  300 mg   Take 1 tablet (300 mg) by mouth At Bedtime   Quantity:  90 tablet   Refills:  1            Where to get your medicines      [facility] [address]  [place] - [address] AT [facility] [address], [place] [address]     Phone:  [phone]     ranitidine 300 MG tablet                Primary Care Provider Office Phone # Fax #    Gloria Arguelles [phone]       [facility] [place] [address] [facility] [place] [address]        Equal Access to Services     JOSE J MAHMOOD AH: Caro chavez  katarzyna Prieto, wamiguel ángelda ludanielaadaha, qaarpanta kaludwig mccord, maureen ronakin hayaan coryfrancia renocarol laerinnaima chato. So [facility] [phone].    ATENCIÓN: Si habla español, tiene a chowdhury disposición servicios gratuitos de asistencia lingüística. Apolinar al [phone].    We comply with applicable federal civil rights laws and [place] laws. We do not discriminate on the basis of race, color, national origin, age, disability, sex, sexual orientation, or gender identity.            Thank you!     Thank you for choosing [facility]  for your care. Our goal is always to provide you with excellent care. Hearing back from our patients is one way we can continue to improve our services. Please take a few minutes to complete the written survey that you may receive in the mail after your visit with us. Thank you!             Your Updated Medication List - Protect others around you: Learn how to safely use, store and throw away your medicines at www.disposemymeds.org.          This list is accurate as of 3/21/18  2:17 PM.  Always use your most recent med list.                   Brand Name Dispense Instructions for use Diagnosis    Fish Oil Oil      1 capsule daily        GREEN TEA PO      Take 1 tablet by mouth daily        HERBALS      DEM        multivitamin, therapeutic Tabs tablet      Take 1 tablet by mouth daily        ranitidine 300 MG tablet    ZANTAC    90 tablet    Take 1 tablet (300 mg) by mouth At Bedtime    Gastroesophageal reflux disease, esophagitis presence not specified

## 2018-03-22 ASSESSMENT — ANXIETY QUESTIONNAIRES: GAD7 TOTAL SCORE: 0

## 2018-03-22 ASSESSMENT — PATIENT HEALTH QUESTIONNAIRE - PHQ9: SUM OF ALL RESPONSES TO PHQ QUESTIONS 1-9: 2

## 2018-03-27 LAB
COPATH REPORT: NORMAL
PAP: NORMAL

## 2018-03-28 ENCOUNTER — OFFICE VISIT (OUTPATIENT)
Dept: SLEEP MEDICINE | Facility: HOSPITAL | Age: 48
End: 2018-03-28
Attending: FAMILY MEDICINE
Payer: COMMERCIAL

## 2018-03-28 VITALS
BODY MASS INDEX: 35.16 KG/M2 | SYSTOLIC BLOOD PRESSURE: 118 MMHG | RESPIRATION RATE: 12 BRPM | OXYGEN SATURATION: 98 % | WEIGHT: 211 LBS | HEIGHT: 65 IN | DIASTOLIC BLOOD PRESSURE: 86 MMHG | HEART RATE: 82 BPM

## 2018-03-28 DIAGNOSIS — E66.9 OBESITY WITH SERIOUS COMORBIDITY, UNSPECIFIED CLASSIFICATION, UNSPECIFIED OBESITY TYPE: ICD-10-CM

## 2018-03-28 DIAGNOSIS — G47.39 OTHER SLEEP APNEA: ICD-10-CM

## 2018-03-28 DIAGNOSIS — F51.01 PRIMARY INSOMNIA: Primary | ICD-10-CM

## 2018-03-28 LAB
FINAL DIAGNOSIS: ABNORMAL
HPV HR 12 DNA CVX QL NAA+PROBE: POSITIVE
HPV16 DNA SPEC QL NAA+PROBE: NEGATIVE
HPV18 DNA SPEC QL NAA+PROBE: NEGATIVE
SPECIMEN DESCRIPTION: ABNORMAL
SPECIMEN SOURCE CVX/VAG CYTO: ABNORMAL

## 2018-03-28 PROCEDURE — 99212 OFFICE O/P EST SF 10 MIN: CPT | Performed by: INTERNAL MEDICINE

## 2018-03-28 PROCEDURE — G0463 HOSPITAL OUTPT CLINIC VISIT: HCPCS

## 2018-03-28 RX ORDER — ZOLPIDEM TARTRATE 5 MG/1
5 TABLET ORAL
Qty: 2 TABLET | Refills: 5 | Status: SHIPPED | OUTPATIENT
Start: 2018-03-28

## 2018-03-28 NOTE — MR AVS SNAPSHOT
After Visit Summary   3/28/2018    MS Ada Ramirez    MRN: [id number]           Patient Information     Date Of Birth          1970        Visit Information        Provider Department      3/28/2018 3:00 PM Brian Blevins MD [facility]        Today's Diagnoses     Primary insomnia    -  1    Other sleep apnea        Obesity with serious comorbidity, unspecified classification, unspecified obesity type          Care Instructions    In order to help your stay at the Sleep Center to be as comfortable as possible and to obtain the best sleep study possible, the Sleep Center Staff has established the following guidelines:    1.  Please attempt to be here 15 minutes prior to your scheduled appointment time.  If you anticipate being late or you cannot make your overnight appointment, please call us at [phone] or call [phone] and ask for the Sleep Center.  PLEASE MAKE EVERY ATTEMPT TO MAKE YOUR APPOINTMENT.  A SLEEP TECHNICIAN AND A SLEEP ROOM HAS BEEN RESERVED JUST FOR YOU.    2. When you arrive at [facility], please park in the upper lot, by [address].  Enter the hospital at the Emergency Room Entrance.  Follow the signs to the Emergency Room Admitting Desk and tell them you are here for a sleep study in the Sleep Disorder Center.    3. Do not stop any medications, unless specifically requested.  Be sure to bring all medications that you need with you.    4. Do not use any hair creams or gels, moisturizers, rinses or sprays the day of your study.  FOR MALES:  if you are usually clean shaven, please shave before you come in; FOR FEMALES:  do not wear make-up or be prepared to remove it.  This will improve the quality of the study.    5. Please DO NOT USE CAFFEINE OR ALCOHOL after 2:00 PM the day of your test unless advised otherwise.    6. Do not take any naps the day of your test.    7. Attachment of monitoring equipment will take approximately one hour, including an  "explanation of the test.    8. Bring comfortable night clothes to sleep in, two-piece, cotton pajamas or shorts are best.    9. If you have a pillow that you prefer using, please bring it with you; but do not forget to take it home with you in the morning.    10. Most of our studies are complete by approximately 7:00 AM.  In most instances we may wake you during your normal wake time or the time you request to be awakened.    If you have any special needs or questions related to this information or your test, please feel free to call the Sleep Disorder Center at [phone] or [phone] and ask for the Sleep Disorder Center.  Please make every effort to keep your appointment.  A sleep technician and a sleep room have been reserved just for you.  In the event that you are unable to make your appointment, please call as soon as possible to notify us of your cancellation.            Follow-ups after your visit        Your next 10 appointments already scheduled     May 16, 2018  8:00 AM CDT   (Arrive by 7:45 AM)   MA SCREENING DIGITAL BILATERAL with HCMA1   [facility] ([facility] [place] )    [address] [address]   [phone]           Do not use any powder, lotion or deodorant under your arms or on your breast. If you do, we will ask you to remove it before your exam.  Wear comfortable, two-piece clothing.  If you have any allergies, tell your care team.  Bring any previous mammograms from other facilities or have them mailed to the breast center. Three-dimensional (3D) mammograms are available at [place] locations in [facility], [facility], and [place]. [facility] locations include [place] and [facility] in [place]. Benefits of 3D mammograms include: - Improved rate of cancer detection - Decreases your chance of having to go back for more tests, which means fewer: - \"False-positive\" results (This " means that there is an abnormal area but it isn't cancer.) - Invasive testing procedures, such as a biopsy or surgery - Can provide clearer images of the breast if you have dense breast tissue. 3D mammography is an optional exam that anyone can have with a 2D mammogram. It doesn't replace or take the place of a 2D mammogram. 2D mammograms remain an effective screening test for all women.  Not all insurance companies cover the cost of a 3D mammogram. Check with your insurance.            Mar 22, 2019  8:45 AM CDT   (Arrive by 8:30 AM)   PHYSICAL with Gloria Arguelles MD   [facility] [place] ([facility] [place] )    [address]  [place] [address]   [phone]              Future tests that were ordered for you today     Open Future Orders        Priority Expected Expires Ordered    Comprehensive Sleep Study Routine  9/24/2018 3/28/2018            Who to contact     If you have questions or need follow up information about today's clinic visit or your schedule please contact [facility] directly at [phone].  Normal or non-critical lab and imaging results will be communicated to you by eucl3Dhart, letter or phone within 4 business days after the clinic has received the results. If you do not hear from us within 7 days, please contact the clinic through "[employer]t or phone. If you have a critical or abnormal lab result, we will notify you by phone as soon as possible.  Submit refill requests through [employer] or call your pharmacy and they will forward the refill request to us. Please allow 3 business days for your refill to be completed.          Additional Information About Your Visit        [employer] Information     [employer] gives you secure access to your electronic health record. If you see a primary care provider, you can also send messages to your care team and make appointments. If you have questions, please call your primary care clinic.  If you do not have a primary care provider,  "please call [phone] and they will assist you.        Care EveryWhere ID     This is your Care EveryWhere ID. This could be used by other organizations to access your [place] medical records  [id number]        Your Vitals Were     Pulse Respirations Height Pulse Oximetry BMI (Body Mass Index)       82 12 5' 5\" (1.651 m) 98% 35.11 kg/m2        Blood Pressure from Last 3 Encounters:   03/28/18 118/86   03/21/18 126/72   02/20/17 128/78    Weight from Last 3 Encounters:   03/28/18 211 lb (95.7 kg)   03/21/18 211 lb (95.7 kg)   02/20/17 204 lb (92.5 kg)              We Performed the Following     SLEEP EVALUATION & MANAGEMENT REFERRAL - ADULT -[facility] [place] [phone] (Age 5 and up)          Today's Medication Changes          These changes are accurate as of 3/28/18  3:11 PM.  If you have any questions, ask your nurse or doctor.               Start taking these medicines.        Dose/Directions    zolpidem 5 MG tablet   Commonly known as:  AMBIEN   Used for:  Primary insomnia        Dose:  5 mg   Take 1 tablet (5 mg) by mouth nightly as needed for sleep   Quantity:  2 tablet   Refills:  5            Where to get your medicines      Some of these will need a paper prescription and others can be bought over the counter.  Ask your nurse if you have questions.     Bring a paper prescription for each of these medications     zolpidem 5 MG tablet                Primary Care Provider Office Phone # Fax #    Gloria Arguelles [phone]       [facility] [place] [address]  [place] [address]        Equal Access to Services     [facility] AH: Hadii giselle chavez hadasho Soomaali, waaxda luqadaha, qaybta kaalmada maureen mccord. So [facility] [phone].    ATENCIÓN: Si habla español, tiene a chowdhury disposición servicios gratuitos de asistencia lingüística. Llame al [phone].    We comply with applicable federal civil rights laws and [place] " laws. We do not discriminate on the basis of race, color, national origin, age, disability, sex, sexual orientation, or gender identity.            Thank you!     Thank you for choosing [facility]  for your care. Our goal is always to provide you with excellent care. Hearing back from our patients is one way we can continue to improve our services. Please take a few minutes to complete the written survey that you may receive in the mail after your visit with us. Thank you!             Your Updated Medication List - Protect others around you: Learn how to safely use, store and throw away your medicines at www.disposemymeds.org.          This list is accurate as of 3/28/18  3:11 PM.  Always use your most recent med list.                   Brand Name Dispense Instructions for use Diagnosis    Fish Oil Oil      1 capsule daily        GREEN TEA PO      Take 1 tablet by mouth daily        HERBALS      DEM        multivitamin, therapeutic Tabs tablet      Take 1 tablet by mouth daily        ranitidine 300 MG tablet    ZANTAC    90 tablet    Take 1 tablet (300 mg) by mouth At Bedtime    Gastroesophageal reflux disease, esophagitis presence not specified       zolpidem 5 MG tablet    AMBIEN    2 tablet    Take 1 tablet (5 mg) by mouth nightly as needed for sleep    Primary insomnia

## 2018-03-28 NOTE — PROGRESS NOTES
"48 y/o referred by Dr Arguelles with BASIL. Pt has been told she snores loudly and has apneas/gasps. She has a study in [place] 10 y ago that showed just mild BASIL, her weight is up 25 lbs since then and she is much sleepier, falls asleep easily in quiet situations. Sleep hours are 8 to 6, mild insomnia. No AM headaches or restless legs, however she thrashes around a lot while sleeping. At 5 5 206.    PMH ADHD, anxiety, GERD    PE  /86  Pulse 82  Resp 12  Ht 5' 5\" (1.651 m)  Wt 211 lb (95.7 kg)  SpO2 98%  BMI 35.11 kg/m2            [place]  Narrow pharynx, retrognathic chin, full anterior neck      Current Outpatient Prescriptions:      zolpidem (AMBIEN) 5 MG tablet, Take 1 tablet (5 mg) by mouth nightly as needed for sleep, Disp: 2 tablet, Rfl: 5     ranitidine (ZANTAC) 300 MG tablet, Take 1 tablet (300 mg) by mouth At Bedtime, Disp: 90 tablet, Rfl: 1     Green Tea, Camillia sinensis, (GREEN TEA PO), Take 1 tablet by mouth daily, Disp: , Rfl:      Fish Oil OIL, 1 capsule daily, Disp: , Rfl:      HERBALS, DEM, Disp: , Rfl:      multivitamin, therapeutic (THERA-VIT) TABS tablet, Take 1 tablet by mouth daily, Disp: , Rfl:      A/ BASIL  Study ordered, ? PLMs.  "

## 2018-03-28 NOTE — PATIENT INSTRUCTIONS
In order to help your stay at the Sleep Center to be as comfortable as possible and to obtain the best sleep study possible, the Sleep Center Staff has established the following guidelines:    1.  Please attempt to be here 15 minutes prior to your scheduled appointment time.  If you anticipate being late or you cannot make your overnight appointment, please call us at [phone] or call [phone] and ask for the Sleep Center.  PLEASE MAKE EVERY ATTEMPT TO MAKE YOUR APPOINTMENT.  A SLEEP TECHNICIAN AND A SLEEP ROOM HAS BEEN RESERVED JUST FOR YOU.    2. When you arrive at [facility], please park in the upper lot, by [address].  Enter the hospital at the Emergency Room Entrance.  Follow the signs to the Emergency Room Admitting Desk and tell them you are here for a sleep study in the Sleep Disorder Center.    3. Do not stop any medications, unless specifically requested.  Be sure to bring all medications that you need with you.    4. Do not use any hair creams or gels, moisturizers, rinses or sprays the day of your study.  FOR MALES:  if you are usually clean shaven, please shave before you come in; FOR FEMALES:  do not wear make-up or be prepared to remove it.  This will improve the quality of the study.    5. Please DO NOT USE CAFFEINE OR ALCOHOL after 2:00 PM the day of your test unless advised otherwise.    6. Do not take any naps the day of your test.    7. Attachment of monitoring equipment will take approximately one hour, including an explanation of the test.    8. Bring comfortable night clothes to sleep in, two-piece, cotton pajamas or shorts are best.    9. If you have a pillow that you prefer using, please bring it with you; but do not forget to take it home with you in the morning.    10. Most of our studies are complete by approximately 7:00 AM.  In most instances we may wake you during your normal wake time or the time you request to be awakened.    If you have any special needs or  questions related to this information or your test, please feel free to call the Sleep Disorder Center at [phone] or [phone] and ask for the Sleep Disorder Center.  Please make every effort to keep your appointment.  A sleep technician and a sleep room have been reserved just for you.  In the event that you are unable to make your appointment, please call as soon as possible to notify us of your cancellation.

## 2018-03-28 NOTE — NURSING NOTE
Patient ID checked with name and date of birth. Reviewed allergies and home medications. Took Vitals and brief medical history.   An overnight sleep test was ordered and the instructions reviewed she had god understanding.

## 2018-04-03 ENCOUNTER — THERAPY VISIT (OUTPATIENT)
Dept: SLEEP MEDICINE | Facility: HOSPITAL | Age: 48
End: 2018-04-03
Attending: INTERNAL MEDICINE
Payer: COMMERCIAL

## 2018-04-03 DIAGNOSIS — Z12.39 SCREENING BREAST EXAMINATION: ICD-10-CM

## 2018-04-03 DIAGNOSIS — G47.39 OTHER SLEEP APNEA: ICD-10-CM

## 2018-04-03 PROCEDURE — 95810 POLYSOM 6/> YRS 4/> PARAM: CPT

## 2018-04-03 PROCEDURE — 95810 POLYSOM 6/> YRS 4/> PARAM: CPT | Mod: 26 | Performed by: INTERNAL MEDICINE

## 2018-04-03 NOTE — LETTER
Ada NICHOLE [facility]  [address]  [place] [address]    April 5, 2018       Dear Ada      I recently read your sleep study, you do have sleep apnea stopping or slowing your breathing  about 25 times per hour.     This is likely disturbing your sleep and making you feel tired during the day. I think we should try you on an automatically adjusting CPAP mask , hopefully it will make you feel more rested during the day and may help protect you from future health problems.     I will ask our sleep lab staff to set up a trial of the mask, if you have any problems or concerns please give us a call.     I'll plan to see you in follow up in the future and I hope it helps.                                                                                       Sincerely,        Brian Blevins MD, D,[facility]           [address] [place] [id number] [phone]

## 2018-04-03 NOTE — MR AVS SNAPSHOT
"              After Visit Summary   4/3/2018    MS Ada Ramirez    MRN: [id number]           Patient Information     Date Of Birth          1970        Visit Information        Provider Department      4/3/2018 7:30 PM [facility]        Today's Diagnoses     Other sleep apnea        Screening breast examination           Follow-ups after your visit        Your next 10 appointments already scheduled     May 16, 2018  8:00 AM CDT   (Arrive by 7:45 AM)   MA SCREENING DIGITAL BILATERAL with HCMA1   [facility] [place] [facility] ([facility] [place] )    [address]  [place] [address]   [phone]           Do not use any powder, lotion or deodorant under your arms or on your breast. If you do, we will ask you to remove it before your exam.  Wear comfortable, two-piece clothing.  If you have any allergies, tell your care team.  Bring any previous mammograms from other facilities or have them mailed to the breast center. Three-dimensional (3D) mammograms are available at [place] locations in [facility], [facility], [facility], [place], [place], and [place]. [facility] locations include [place] and Clinic [facility] Surgery Center in [place]. Benefits of 3D mammograms include: - Improved rate of cancer detection - Decreases your chance of having to go back for more tests, which means fewer: - \"False-positive\" results (This means that there is an abnormal area but it isn't cancer.) - Invasive testing procedures, such as a biopsy or surgery - Can provide clearer images of the breast if you have dense breast tissue. 3D mammography is an optional exam that anyone can have with a 2D mammogram. It doesn't replace or take the place of a 2D mammogram. 2D mammograms remain an effective screening test for all women.  Not all insurance companies cover the cost of a 3D mammogram. Check with your insurance.            Mar 22, 2019  8:45 AM CDT   (Arrive by 8:30 " AM)   PHYSICAL with Gloria Arguelles MD   [facility] [place] ([facility] [place] )    [address]  [place] [address]   [phone]              Who to contact     If you have questions or need follow up information about today's clinic visit or your schedule please contact [facility] directly at [phone].  Normal or non-critical lab and imaging results will be communicated to you by MyChart, letter or phone within 4 business days after the clinic has received the results. If you do not hear from us within 7 days, please contact the clinic through [employer] or phone. If you have a critical or abnormal lab result, we will notify you by phone as soon as possible.  Submit refill requests through [employer] or call your pharmacy and they will forward the refill request to us. Please allow 3 business days for your refill to be completed.          Additional Information About Your Visit        [employer] Information     [employer] gives you secure access to your electronic health record. If you see a primary care provider, you can also send messages to your care team and make appointments. If you have questions, please call your primary care clinic.  If you do not have a primary care provider, please call [phone] and they will assist you.        Care EveryWhere ID     This is your Care EveryWhere ID. This could be used by other organizations to access your [place] medical records  [id number]         Blood Pressure from Last 3 Encounters:   03/28/18 118/86   03/21/18 126/72   02/20/17 128/78    Weight from Last 3 Encounters:   03/28/18 211 lb (95.7 kg)   03/21/18 211 lb (95.7 kg)   02/20/17 204 lb (92.5 kg)              We Performed the Following     Comprehensive DME     Comprehensive Sleep Study     MA SCREENING DIGITAL BILATERAL ([place])     SLEEP STUDY - HIM SCAN        Primary Care Provider Office Phone # Fax #    Gloria Arguelles [phone]       [facility]  [place] [address]  [place] [address]        Equal Access to Services     [facility] : Hadii giselle chavez hadluzo Soomaali, waaxda luqadaha, qaybta kaalmada yaimaadrianamanuel, maureen willoughby shadynaima bojorquezbrandilance reis. So [facility] [place] [phone].    ATENCIÓN: Si habla español, tiene a chowdhury disposición servicios gratuitos de asistencia lingüística. Llame al [phone].    We comply with applicable federal civil rights laws and [place] laws. We do not discriminate on the basis of race, color, national origin, age, disability, sex, sexual orientation, or gender identity.            Thank you!     Thank you for choosing [facility]  for your care. Our goal is always to provide you with excellent care. Hearing back from our patients is one way we can continue to improve our services. Please take a few minutes to complete the written survey that you may receive in the mail after your visit with us. Thank you!             Your Updated Medication List - Protect others around you: Learn how to safely use, store and throw away your medicines at www.disposemymeds.org.          This list is accurate as of 4/3/18 11:59 PM.  Always use your most recent med list.                   Brand Name Dispense Instructions for use Diagnosis    Fish Oil Oil      1 capsule daily        GREEN TEA PO      Take 1 tablet by mouth daily        HERBALS      DEM        multivitamin, therapeutic Tabs tablet      Take 1 tablet by mouth daily        ranitidine 300 MG tablet    ZANTAC    90 tablet    Take 1 tablet (300 mg) by mouth At Bedtime    Gastroesophageal reflux disease, esophagitis presence not specified       zolpidem 5 MG tablet    AMBIEN    2 tablet    Take 1 tablet (5 mg) by mouth nightly as needed for sleep    Primary insomnia

## 2018-04-04 NOTE — PROGRESS NOTES
Patient is a 46 y/o female in with c/o snoring and EDS.  Sleep and REM latency normal.  Loud snoring with associated arousals and obstructive respiratory events noted t/o study, being the most severe during REM stage.  Patient was not attempted on CPAP as technologist did not feel that patient qualified early enough in study.  Patient tolerated study well.

## 2018-04-04 NOTE — PROGRESS NOTES
The Pt was identified by name and  as well as wristband.  She is a 46 yo female with c/o EDS and snoring.  Sleep testing and possible findings were discussed during hook up.  BASIL and CPAP therapy were explained as she was fitted with a ResMed Airfit N20 small mask.  Sleep onset was normal.  She had loud and often heroic snoring and scattered hypopneas and arousals in nonREM sleep.  In REM sleep, she had repeated apneas and hypopneas.  The SpO2 baseline in sleep was 95%.  The lowest SpO2 was 80% seen in REM.  No PLMs or arrhythmias were noted. CPAP was not attempted as she did not appear to meet criteria early enough.  The preliminary results were discussed. She reports a poor night of sleep.  She was told of the probability of a CPAP trial.  After reviewing the vendor choices, she prefers [facility] for her DME.

## 2018-04-05 NOTE — PROGRESS NOTES
46 y/o referred by Dr Arguelles for excessive daytime somnolence.   Overnight 18 channel polysomnography was done 4/3/18, I reviewed the raw data in detail.Please see scanned sleep study for full results.Sleep efficiency was normal with a normal sleep latency and a normal REM latency. Sleep architecture shows all stages seen in usual amounts for age. Baseline oxyhemoglobin saturation was 98%. The ECG was monitored and no arrhythmias were seen. There were no significant periodic leg movements noted.              Technician noted loud snoring. We measured 65 apneas and 106 hypopneas early in the study. These events were associated with EEG arousals and desats down to 80%. The apnea hypopnea index was 24 events per hour. cpap titration was not done.      Impression: BASIL  autoPap.

## 2019-01-16 ENCOUNTER — OFFICE VISIT (OUTPATIENT)
Dept: FAMILY MEDICINE | Facility: OTHER | Age: 49
End: 2019-01-16
Attending: FAMILY MEDICINE
Payer: COMMERCIAL

## 2019-01-16 VITALS
TEMPERATURE: 97.3 F | HEIGHT: 65 IN | SYSTOLIC BLOOD PRESSURE: 114 MMHG | DIASTOLIC BLOOD PRESSURE: 82 MMHG | OXYGEN SATURATION: 98 % | HEART RATE: 68 BPM | RESPIRATION RATE: 20 BRPM | WEIGHT: 211 LBS | BODY MASS INDEX: 35.16 KG/M2

## 2019-01-16 DIAGNOSIS — G56.03 BILATERAL CARPAL TUNNEL SYNDROME: ICD-10-CM

## 2019-01-16 DIAGNOSIS — R20.2 PARESTHESIAS: Primary | ICD-10-CM

## 2019-01-16 DIAGNOSIS — R73.09 ELEVATED GLUCOSE: ICD-10-CM

## 2019-01-16 DIAGNOSIS — G57.12 MERALGIA PARAESTHETICA, LEFT: ICD-10-CM

## 2019-01-16 PROBLEM — E66.01 MORBID OBESITY (H): Status: ACTIVE | Noted: 2019-01-16

## 2019-01-16 PROBLEM — E66.01 MORBID OBESITY (H): Status: RESOLVED | Noted: 2019-01-16 | Resolved: 2019-01-16

## 2019-01-16 LAB
EST. AVERAGE GLUCOSE BLD GHB EST-MCNC: 117 MG/DL
HBA1C MFR BLD: 5.7 % (ref 0–5.6)
VIT B12 SERPL-MCNC: 837 PG/ML (ref 193–986)

## 2019-01-16 PROCEDURE — 83036 HEMOGLOBIN GLYCOSYLATED A1C: CPT | Performed by: FAMILY MEDICINE

## 2019-01-16 PROCEDURE — 36415 COLL VENOUS BLD VENIPUNCTURE: CPT | Performed by: FAMILY MEDICINE

## 2019-01-16 PROCEDURE — 82607 VITAMIN B-12: CPT | Mod: 90 | Performed by: FAMILY MEDICINE

## 2019-01-16 PROCEDURE — 99213 OFFICE O/P EST LOW 20 MIN: CPT | Performed by: FAMILY MEDICINE

## 2019-01-16 PROCEDURE — 99000 SPECIMEN HANDLING OFFICE-LAB: CPT | Performed by: FAMILY MEDICINE

## 2019-01-16 ASSESSMENT — MIFFLIN-ST. JEOR: SCORE: 1577.97

## 2019-01-16 ASSESSMENT — PAIN SCALES - GENERAL: PAINLEVEL: NO PAIN (0)

## 2019-01-16 NOTE — PROGRESS NOTES
SUBJECTIVE:   Ada Ramirez is a 48 year old female who presents to clinic today for the following health issues:      Musculoskeletal problem/pain      Duration: 6 months    Description  Location: Tingling in both hands and pain down left leg    Intensity:  mild    Accompanying signs and symptoms: radiation of pain to left leg, numbness and tingling, burning sensation    History  Previous similar problem: no   Previous evaluation:  none    Precipitating or alleviating factors:  Trauma or overuse: no   Aggravating factors include: none    Therapies tried and outcome: rest/inactivity and Ibuprofen          Problem list and histories reviewed & adjusted, as indicated.  Additional history: as documented    Patient Active Problem List   Diagnosis     Hyperlipidemia with target LDL less than 130     ACP (advance care planning)     Attention deficit hyperactivity disorder (ADHD), predominantly inattentive type     Family history of amebiasis     Elevated glucose     Gastroesophageal reflux disease, esophagitis presence not specified     Obesity (BMI 35.0-39.9) with comorbidity (H)     Bilateral carpal tunnel syndrome     Meralgia paraesthetica, left     Past Surgical History:   Procedure Laterality Date     HYSTERECTOMY  2012    ovaries, cervix remain, fibroids, menorrhagia       Social History     Tobacco Use     Smoking status: Never Smoker     Smokeless tobacco: Never Used   Substance Use Topics     Alcohol use: Yes     Comment: 1/week     Family History   Problem Relation Age of Onset     Hypoglycemia Mother      Psychotic Disorder Father         ADHD     Diabetes Father         borderline, 2018     Sleep Apnea Father      Breast Cancer Maternal Grandmother 58        smoker     Neurologic Disorder Sister         MS, not on meds, may not have dx     Family History Negative Brother      Psychotic Disorder Paternal Grandmother         demenita     Cerebrovascular Disease Paternal Grandfather      Unknown/Adopted  "Maternal Grandfather         accident     Diabetes Paternal Uncle          Current Outpatient Medications   Medication Sig Dispense Refill     Fish Oil OIL 1 capsule daily       HERBALS DEM       multivitamin, therapeutic (THERA-VIT) TABS tablet Take 1 tablet by mouth daily       order for DME Equipment being ordered:  Bilateral wrists splints 2 Units 0     ranitidine (ZANTAC) 300 MG tablet Take 1 tablet (300 mg) by mouth At Bedtime 90 tablet 1     Green Tea, Camillia sinensis, (GREEN TEA PO) Take 1 tablet by mouth daily       zolpidem (AMBIEN) 5 MG tablet Take 1 tablet (5 mg) by mouth nightly as needed for sleep (Patient not taking: Reported on 1/16/2019) 2 tablet 5     Allergies   Allergen Reactions     Sulfa Drugs Rash     BP Readings from Last 3 Encounters:   01/16/19 114/82   03/28/18 118/86   03/21/18 126/72    Wt Readings from Last 3 Encounters:   01/16/19 95.7 kg (211 lb)   03/28/18 95.7 kg (211 lb)   03/21/18 95.7 kg (211 lb)                  Labs reviewed in EPIC    Reviewed and updated as needed this visit by clinical staff       Reviewed and updated as needed this visit by Provider         ROS:  Constitutional, HEENT, cardiovascular, pulmonary, gi and gu systems are negative, except as otherwise noted.    OBJECTIVE:                                                    /82 (BP Location: Right arm, Patient Position: Sitting, Cuff Size: Adult Regular)   Pulse 68   Temp 97.3  F (36.3  C) (Tympanic)   Resp 20   Ht 1.651 m (5' 5\")   Wt 95.7 kg (211 lb)   SpO2 98%   BMI 35.11 kg/m     Body mass index is 35.11 kg/m .  GENERAL APPEARANCE: healthy, alert, no distress and over weight  RESP: lungs clear to auscultation - no rales, rhonchi or wheezes  CV: regular rates and rhythm, normal S1 S2, no S3 or S4 and no murmur, click or rub  MS: extremities normal- no gross deformities noted  ORTHO: Wrist Exam: WRIST:  Inspection: no swelling  no effusion  Palpation: Tender: NON TENDER:   Non-tender: distal " radius, distal ulna  Range of Motion: normal  Strength: no deficits  Special tests: positive Tinel's at carpal tunnel.  , positive Phalen's.      ELBOW:  elbow exam not done    Lumber/Thoracic Spine Exam: Tender:  left SI joint, right SI joint  Non-tender:  lumbar spinous processes, lumbar facet joints, left para lumbar muscles, right para lumbar muscles, left sciatic notch, right sciatic notch  Range of Motion:  lumbar flexion  full, lumbar extension  full, left lateral lumbar bending  full, right lateral lumbar bending  full, left lateral lumbar rotation  full, right lateral lumbar rotation  full  Strength:  able to heel walk, able to toe walk  Special tests:  negative straight leg raises, positive SI joint compression    Hip Exam: Hip ROM full, left greater trocanter tender, right greater trocanter tender    NEURO: Normal strength and tone, mentation intact, speech normal, DTR symmetrically normal in lower extremities and gait normal including heel/toe/tandem walking  PSYCH: mentation appears normal and affect normal/bright       ASSESSMENT/PLAN:                                                    1. Paresthesias  Systemic symptoms will check lab  - Vitamin B12    2. Meralgia paraesthetica, left  Vs sciatica -- her exam is negative for sciatica and LBP, will monitor  Discussed conservative things she can do    3. Bilateral carpal tunnel syndrome    - order for DME; Equipment being ordered:  Bilateral wrists splints  Dispense: 2 Units; Refill: 0    4. Elevated glucose    - Hemoglobin A1c    Patient was agreeable to this plan and had no further questions.  See Patient Instructions    Gloria Arguelles MD  [facility] [place]

## 2019-01-16 NOTE — NURSING NOTE
"Chief Complaint   Patient presents with     Musculoskeletal Problem       Initial /82 (BP Location: Right arm, Patient Position: Sitting, Cuff Size: Adult Regular)   Pulse 68   Temp 97.3  F (36.3  C) (Tympanic)   Resp 20   Ht 1.651 m (5' 5\")   Wt 95.7 kg (211 lb)   SpO2 98%   BMI 35.11 kg/m   Estimated body mass index is 35.11 kg/m  as calculated from the following:    Height as of this encounter: 1.651 m (5' 5\").    Weight as of this encounter: 95.7 kg (211 lb).  Medication Reconciliation: complete    Emely Woodward LPN  "

## 2019-03-14 NOTE — PROGRESS NOTES
SUBJECTIVE:   CC: Ada Ramirez is an 48 year old woman who presents for preventive health visit.     Healthy Habits:    Do you get at least three servings of calcium containing foods daily (dairy, green leafy vegetables, etc.)? yes    Amount of exercise or daily activities, outside of work: None    Problems taking medications regularly No    Medication side effects: No    Have you had an eye exam in the past two years? yes    Do you see a dentist twice per year? no    Do you have sleep apnea, excessive snoring or daytime drowsiness? Yes, uses CPAP      Today's PHQ-2 Score:   PHQ-2 ( 1999 [employer]) 3/22/2019   Q1: Little interest or pleasure in doing things 0   Q2: Feeling down, depressed or hopeless 0   PHQ-2 Score 0     Abuse: Current or Past(Physical, Sexual or Emotional)- No  Do you feel safe in your environment? Yes    Social History     Tobacco Use     Smoking status: Never Smoker     Smokeless tobacco: Never Used   Substance Use Topics     Alcohol use: Yes     Comment: 1/week     If you drink alcohol do you typically have >3 drinks per day or >7 drinks per week? No                     Reviewed orders with patient.  Reviewed health maintenance and updated orders accordingly - Yes  Labs reviewed in EPIC  BP Readings from Last 3 Encounters:   03/22/19 132/88   01/16/19 114/82   03/28/18 118/86    Wt Readings from Last 3 Encounters:   03/22/19 99.3 kg (219 lb)   01/16/19 95.7 kg (211 lb)   03/28/18 95.7 kg (211 lb)                  Patient Active Problem List   Diagnosis     Hyperlipidemia with target LDL less than 130     ACP (advance care planning)     Attention deficit hyperactivity disorder (ADHD), predominantly inattentive type     Family history of amebiasis     Elevated glucose     Gastroesophageal reflux disease, esophagitis presence not specified     Obesity (BMI 35.0-39.9) with comorbidity (H)     Bilateral carpal tunnel syndrome     Meralgia paraesthetica, left     Past Surgical History:   Procedure  Laterality Date     HYSTERECTOMY  2012    ovaries, cervix remain, fibroids, menorrhagia       Social History     Tobacco Use     Smoking status: Never Smoker     Smokeless tobacco: Never Used   Substance Use Topics     Alcohol use: Yes     Comment: 1/week     Family History   Problem Relation Age of Onset     Hypoglycemia Mother      Psychotic Disorder Father         ADHD     Diabetes Father         borderline, 2018     Sleep Apnea Father      Breast Cancer Maternal Grandmother 58        smoker     Neurologic Disorder Sister         MS, not on meds, may not have dx     Family History Negative Brother      Psychotic Disorder Paternal Grandmother         demenita     Cerebrovascular Disease Paternal Grandfather      Unknown/Adopted Maternal Grandfather         accident     Diabetes Paternal Uncle          Current Outpatient Medications   Medication Sig Dispense Refill     Fish Oil OIL 1 capsule daily       Green Tea, Camillia sinensis, (GREEN TEA PO) Take 1 tablet by mouth daily       HERBALS DEM       multivitamin, therapeutic (THERA-VIT) TABS tablet Take 1 tablet by mouth daily       order for DME Equipment being ordered:  Bilateral wrists splints 2 Units 0     ranitidine (ZANTAC) 300 MG tablet Take 1 tablet (300 mg) by mouth At Bedtime 90 tablet 1     zolpidem (AMBIEN) 5 MG tablet Take 1 tablet (5 mg) by mouth nightly as needed for sleep 2 tablet 5     Allergies   Allergen Reactions     Sulfa Drugs Rash       Mammogram Screening: Patient over age 50, mutual decision to screen reflected in health maintenance.    Pertinent mammograms are reviewed under the imaging tab.  History of abnormal Pap smear: NO - age 30-65 PAP every 5 years with negative HPV co-testing recommended  PAP / HPV Latest Ref Rng & Units 3/21/2018 1/28/2015   PAP - NIL NIL   HPV 16 DNA NEG:Negative Negative -   HPV 18 DNA NEG:Negative Negative -   OTHER HR HPV NEG:Negative Positive(A) -     Reviewed and updated as needed this visit by clinical  "staff  Tobacco  Allergies  Meds  Med Hx  Surg Hx  Fam Hx  Soc Hx        Reviewed and updated as needed this visit by Provider        Past Medical History:   Diagnosis Date     Acne      ADHD (attention deficit hyperactivity disorder)     takes caffeine     Anxiety      Constipation      Elevated glucose 2018     GERD (gastroesophageal reflux disease) 2018     Obesity      Rosacea      Sleep apnea 2000     Tremor of hands and face 1980    essential tremor      Past Surgical History:   Procedure Laterality Date     HYSTERECTOMY      ovaries, cervix remain, fibroids, menorrhagia     Obstetric History       T0      L0     SAB0   TAB0   Ectopic0   Multiple0   Live Births0           ROS:  CONSTITUTIONAL: NEGATIVE for fever, chills, change in weight  INTEGUMENTARU/SKIN: NEGATIVE for worrisome rashes, moles or lesions  EYES: NEGATIVE for vision changes or irritation  ENT: NEGATIVE for ear, mouth and throat problems  RESP: NEGATIVE for significant cough or SOB  BREAST: NEGATIVE for masses, tenderness or discharge  CV: NEGATIVE for chest pain, palpitations or peripheral edema  GI: NEGATIVE for nausea, abdominal pain, heartburn, or change in bowel habits  : NEGATIVE for unusual urinary or vaginal symptoms. Periods are regular.  MUSCULOSKELETAL: NEGATIVE for significant arthralgias or myalgia  NEURO: NEGATIVE for weakness, dizziness or paresthesias  PSYCHIATRIC: NEGATIVE for changes in mood or affect    OBJECTIVE:   /88 (BP Location: Right arm, Patient Position: Sitting, Cuff Size: Adult Regular)   Pulse 78   Temp 98.6  F (37  C) (Tympanic)   Resp 20   Ht 1.626 m (5' 4\")   Wt 99.3 kg (219 lb)   SpO2 98%   BMI 37.59 kg/m    EXAM:  GENERAL: healthy, alert and no distress  EYES: Eyes grossly normal to inspection, PERRL and conjunctivae and sclerae normal  HENT: ear canals and TM's normal, nose and mouth without ulcers or lesions  NECK: no adenopathy, no asymmetry, masses, or scars and thyroid " normal to palpation  RESP: lungs clear to auscultation - no rales, rhonchi or wheezes  BREAST: normal without masses, tenderness or nipple discharge and no palpable axillary masses or adenopathy  CV: regular rate and rhythm, normal S1 S2, no S3 or S4, no murmur, click or rub, no peripheral edema and peripheral pulses strong  ABDOMEN: soft, nontender, no hepatosplenomegaly, no masses and bowel sounds normal   (female): normal female external genitalia, normal urethral meatus, vaginal mucosa pink, moist, well rugated, and normal cervix/adnexa/uterus without masses or discharge  MS: no gross musculoskeletal defects noted, no edema  SKIN: no suspicious lesions or rashes  NEURO: Normal strength and tone, mentation intact and speech normal  PSYCH: mentation appears normal, affect normal/bright    Diagnostic Test Results:  No results found for this or any previous visit (from the past 24 hour(s)).    ASSESSMENT/PLAN:   1. Routine general medical examination at a health care facility  Follow-up 1 year    2. Gastroesophageal reflux disease, esophagitis presence not specified  stable    3. Hyperlipidemia with target LDL less than 130  Will do labs next week  - Lipid Profile (Chol, Trig, HDL, LDL calc); Future  - Comprehensive metabolic panel; Future    4. Attention deficit hyperactivity disorder (ADHD), predominantly inattentive type  stable    5. Screening breast examination    - MA SCREENING DIGITAL BILATERAL ([place]); Future    6. Special screening for malignant neoplasms, colon  Only has so much money to pay for medical tests, would like to wait on colonoscopy  - Immunos occult blood; Future    7. Encounter for screening for other viral diseases    - HIV Antigen Antibody Combo; Future    COUNSELING:   Reviewed preventive health counseling, as reflected in patient instructions  Special attention given to:        Regular exercise       Healthy diet/nutrition       Vision screening       Hearing screening       Colon  "cancer screening       HIV screeninx in teen years, 1x in adult years, and at intervals if high risk    BP Readings from Last 1 Encounters:   19 114/82     Estimated body mass index is 35.11 kg/m  as calculated from the following:    Height as of 19: 1.651 m (5' 5\").    Weight as of 19: 95.7 kg (211 lb).      Weight management plan: Discussed healthy diet and exercise guidelines     reports that  has never smoked. she has never used smokeless tobacco.      Counseling Resources:  ATP IV Guidelines  Pooled Cohorts Equation Calculator  Breast Cancer Risk Calculator  FRAX Risk Assessment  ICSI Preventive Guidelines  Dietary Guidelines for Americans, 2010  USDA's MyPlate  ASA Prophylaxis  Lung CA Screening    Gloria Arguelles MD  [facility] [place]  "

## 2019-03-14 NOTE — PATIENT INSTRUCTIONS
Satish Diaz DDS    Preventive Health Recommendations  Female Ages 50 - 64    Yearly exam: See your health care provider every year in order to  o Review health changes.   o Discuss preventive care.    o Review your medicines if your doctor has prescribed any.      Get a Pap test every three years (unless you have an abnormal result and your provider advises testing more often).    If you get Pap tests with HPV test, you only need to test every 5 years, unless you have an abnormal result.     You do not need a Pap test if your uterus was removed (hysterectomy) and you have not had cancer.    You should be tested each year for STDs (sexually transmitted diseases) if you're at risk.     Have a mammogram every 1 to 2 years.    Have a colonoscopy at age 50, or have a yearly FIT test (stool test). These exams screen for colon cancer.      Have a cholesterol test every 5 years, or more often if advised.    Have a diabetes test (fasting glucose) every three years. If you are at risk for diabetes, you should have this test more often.     If you are at risk for osteoporosis (brittle bone disease), think about having a bone density scan (DEXA).    Shots: Get a flu shot each year. Get a tetanus shot every 10 years.    Nutrition:     Eat at least 5 servings of fruits and vegetables each day.    Eat whole-grain bread, whole-wheat pasta and brown rice instead of white grains and rice.    Get adequate Calcium and Vitamin D.     Lifestyle    Exercise at least 150 minutes a week (30 minutes a day, 5 days a week). This will help you control your weight and prevent disease.    Limit alcohol to one drink per day.    No smoking.     Wear sunscreen to prevent skin cancer.     See your dentist every six months for an exam and cleaning.    See your eye doctor every 1 to 2 years.

## 2019-03-22 ENCOUNTER — OFFICE VISIT (OUTPATIENT)
Dept: FAMILY MEDICINE | Facility: OTHER | Age: 49
End: 2019-03-22
Attending: FAMILY MEDICINE
Payer: COMMERCIAL

## 2019-03-22 VITALS
HEART RATE: 78 BPM | HEIGHT: 64 IN | SYSTOLIC BLOOD PRESSURE: 132 MMHG | DIASTOLIC BLOOD PRESSURE: 88 MMHG | WEIGHT: 219 LBS | RESPIRATION RATE: 20 BRPM | BODY MASS INDEX: 37.39 KG/M2 | TEMPERATURE: 98.6 F | OXYGEN SATURATION: 98 %

## 2019-03-22 DIAGNOSIS — E78.5 HYPERLIPIDEMIA WITH TARGET LDL LESS THAN 130: ICD-10-CM

## 2019-03-22 DIAGNOSIS — Z11.59 ENCOUNTER FOR SCREENING FOR OTHER VIRAL DISEASES: ICD-10-CM

## 2019-03-22 DIAGNOSIS — F90.0 ATTENTION DEFICIT HYPERACTIVITY DISORDER (ADHD), PREDOMINANTLY INATTENTIVE TYPE: ICD-10-CM

## 2019-03-22 DIAGNOSIS — Z12.11 SPECIAL SCREENING FOR MALIGNANT NEOPLASMS, COLON: ICD-10-CM

## 2019-03-22 DIAGNOSIS — K21.9 GASTROESOPHAGEAL REFLUX DISEASE, ESOPHAGITIS PRESENCE NOT SPECIFIED: ICD-10-CM

## 2019-03-22 DIAGNOSIS — Z12.39 SCREENING BREAST EXAMINATION: ICD-10-CM

## 2019-03-22 DIAGNOSIS — Z00.00 ROUTINE GENERAL MEDICAL EXAMINATION AT A HEALTH CARE FACILITY: Primary | ICD-10-CM

## 2019-03-22 PROCEDURE — 99396 PREV VISIT EST AGE 40-64: CPT | Performed by: FAMILY MEDICINE

## 2019-03-22 ASSESSMENT — PATIENT HEALTH QUESTIONNAIRE - PHQ9: SUM OF ALL RESPONSES TO PHQ QUESTIONS 1-9: 1

## 2019-03-22 ASSESSMENT — ANXIETY QUESTIONNAIRES
IF YOU CHECKED OFF ANY PROBLEMS ON THIS QUESTIONNAIRE, HOW DIFFICULT HAVE THESE PROBLEMS MADE IT FOR YOU TO DO YOUR WORK, TAKE CARE OF THINGS AT HOME, OR GET ALONG WITH OTHER PEOPLE: NOT DIFFICULT AT ALL
5. BEING SO RESTLESS THAT IT IS HARD TO SIT STILL: NOT AT ALL
GAD7 TOTAL SCORE: 0
3. WORRYING TOO MUCH ABOUT DIFFERENT THINGS: NOT AT ALL
7. FEELING AFRAID AS IF SOMETHING AWFUL MIGHT HAPPEN: NOT AT ALL
2. NOT BEING ABLE TO STOP OR CONTROL WORRYING: NOT AT ALL
6. BECOMING EASILY ANNOYED OR IRRITABLE: NOT AT ALL
1. FEELING NERVOUS, ANXIOUS, OR ON EDGE: NOT AT ALL
4. TROUBLE RELAXING: NOT AT ALL

## 2019-03-22 ASSESSMENT — PAIN SCALES - GENERAL: PAINLEVEL: NO PAIN (0)

## 2019-03-22 ASSESSMENT — MIFFLIN-ST. JEOR: SCORE: 1598.38

## 2019-03-22 NOTE — NURSING NOTE
"Chief Complaint   Patient presents with     Physical       Initial /88 (BP Location: Right arm, Patient Position: Sitting, Cuff Size: Adult Regular)   Pulse 78   Temp 98.6  F (37  C) (Tympanic)   Resp 20   Ht 1.626 m (5' 4\")   Wt 99.3 kg (219 lb)   SpO2 98%   BMI 37.59 kg/m   Estimated body mass index is 37.59 kg/m  as calculated from the following:    Height as of this encounter: 1.626 m (5' 4\").    Weight as of this encounter: 99.3 kg (219 lb).  Medication Reconciliation: complete    Emely Woodward LPN  "

## 2019-03-23 ASSESSMENT — ANXIETY QUESTIONNAIRES: GAD7 TOTAL SCORE: 0

## 2019-04-03 ENCOUNTER — ANCILLARY PROCEDURE (OUTPATIENT)
Dept: MAMMOGRAPHY | Facility: OTHER | Age: 49
End: 2019-04-03
Attending: FAMILY MEDICINE
Payer: COMMERCIAL

## 2019-04-03 DIAGNOSIS — Z12.11 SPECIAL SCREENING FOR MALIGNANT NEOPLASMS, COLON: ICD-10-CM

## 2019-04-03 DIAGNOSIS — Z12.39 SCREENING BREAST EXAMINATION: ICD-10-CM

## 2019-04-03 PROCEDURE — 82274 ASSAY TEST FOR BLOOD FECAL: CPT | Performed by: FAMILY MEDICINE

## 2019-04-03 PROCEDURE — 77063 BREAST TOMOSYNTHESIS BI: CPT | Mod: TC

## 2019-04-03 PROCEDURE — 77067 SCR MAMMO BI INCL CAD: CPT | Mod: TC

## 2019-04-05 LAB — HEMOCCULT SP1 STL QL: NEGATIVE

## 2019-11-27 ENCOUNTER — TELEPHONE (OUTPATIENT)
Dept: FAMILY MEDICINE | Facility: OTHER | Age: 49
End: 2019-11-27

## 2019-11-27 NOTE — TELEPHONE ENCOUNTER
Patient was to have repeat pap smear in 6 months after her last one in March. She is overdue. Called patient and left message to return call back to be scheduled for repeat pap. Lucy Romano LPN

## 2020-03-02 ENCOUNTER — HEALTH MAINTENANCE LETTER (OUTPATIENT)
Age: 50
End: 2020-03-02

## 2020-12-20 ENCOUNTER — HEALTH MAINTENANCE LETTER (OUTPATIENT)
Age: 50
End: 2020-12-20

## 2021-01-06 ENCOUNTER — APPOINTMENT (OUTPATIENT)
Dept: URGENT CARE | Facility: CLINIC | Age: 51
End: 2021-01-06

## 2021-02-28 ENCOUNTER — HEALTH MAINTENANCE LETTER (OUTPATIENT)
Age: 51
End: 2021-02-28

## 2021-10-03 ENCOUNTER — HEALTH MAINTENANCE LETTER (OUTPATIENT)
Age: 51
End: 2021-10-03

## 2022-01-23 ENCOUNTER — HEALTH MAINTENANCE LETTER (OUTPATIENT)
Age: 52
End: 2022-01-23

## 2022-02-17 PROBLEM — K21.9 GASTROESOPHAGEAL REFLUX DISEASE: Status: ACTIVE | Noted: 2018-03-21

## 2022-03-20 ENCOUNTER — HEALTH MAINTENANCE LETTER (OUTPATIENT)
Age: 52
End: 2022-03-20

## 2022-09-10 ENCOUNTER — HEALTH MAINTENANCE LETTER (OUTPATIENT)
Age: 52
End: 2022-09-10

## 2023-04-30 ENCOUNTER — HEALTH MAINTENANCE LETTER (OUTPATIENT)
Age: 53
End: 2023-04-30